# Patient Record
Sex: FEMALE | Race: WHITE | Employment: UNEMPLOYED | URBAN - NONMETROPOLITAN AREA
[De-identification: names, ages, dates, MRNs, and addresses within clinical notes are randomized per-mention and may not be internally consistent; named-entity substitution may affect disease eponyms.]

---

## 2017-02-22 DIAGNOSIS — M25.562 LEFT KNEE PAIN: Primary | ICD-10-CM

## 2017-02-24 ENCOUNTER — HOSPITAL ENCOUNTER (OUTPATIENT)
Dept: MRI IMAGING | Facility: HOSPITAL | Age: 37
Discharge: HOME | End: 2017-02-24
Attending: FAMILY MEDICINE | Admitting: FAMILY MEDICINE
Payer: MEDICAID

## 2017-02-24 PROCEDURE — 73721 MRI JNT OF LWR EXTRE W/O DYE: CPT | Mod: TC,LT

## 2017-03-19 ENCOUNTER — HOSPITAL ENCOUNTER (EMERGENCY)
Facility: HOSPITAL | Age: 37
Discharge: HOME | End: 2017-03-19
Attending: PHYSICIAN ASSISTANT | Admitting: PHYSICIAN ASSISTANT
Payer: COMMERCIAL

## 2017-03-19 VITALS
OXYGEN SATURATION: 96 % | SYSTOLIC BLOOD PRESSURE: 137 MMHG | BODY MASS INDEX: 27.89 KG/M2 | TEMPERATURE: 98.6 F | RESPIRATION RATE: 18 BRPM | DIASTOLIC BLOOD PRESSURE: 99 MMHG | HEART RATE: 98 BPM | WEIGHT: 200 LBS
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
## 2017-03-19 DIAGNOSIS — M25.562 ACUTE PAIN OF LEFT KNEE: ICD-10-CM

## 2017-03-19 PROCEDURE — 96372 THER/PROPH/DIAG INJ SC/IM: CPT

## 2017-03-19 PROCEDURE — 25000128 H RX IP 250 OP 636: Performed by: PHYSICIAN ASSISTANT

## 2017-03-19 PROCEDURE — 99284 EMERGENCY DEPT VISIT MOD MDM: CPT | Performed by: PHYSICIAN ASSISTANT

## 2017-03-19 PROCEDURE — 99284 EMERGENCY DEPT VISIT MOD MDM: CPT | Mod: 25

## 2017-03-19 RX ORDER — KETOROLAC TROMETHAMINE 10 MG/1
10 TABLET, FILM COATED ORAL EVERY 6 HOURS PRN
Qty: 20 TABLET | Refills: 0 | Status: SHIPPED | OUTPATIENT
Start: 2017-03-19 | End: 2017-04-07

## 2017-03-19 RX ORDER — KETOROLAC TROMETHAMINE 30 MG/ML
60 INJECTION, SOLUTION INTRAMUSCULAR; INTRAVENOUS ONCE
Status: COMPLETED | OUTPATIENT
Start: 2017-03-19 | End: 2017-03-19

## 2017-03-19 RX ORDER — KETOROLAC TROMETHAMINE 10 MG/1
10 TABLET, FILM COATED ORAL EVERY 4 HOURS PRN
Status: DISCONTINUED | OUTPATIENT
Start: 2017-03-19 | End: 2017-03-19 | Stop reason: HOSPADM

## 2017-03-19 RX ADMIN — KETOROLAC TROMETHAMINE 60 MG: 30 INJECTION, SOLUTION INTRAMUSCULAR; INTRAVENOUS at 20:36
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
## 2017-03-19 NOTE — ED AVS SNAPSHOT
[facility]    [address] [address]    Phone:  [phone]                                       Esperanza Bauer   MRN: [id number]    Department:  [facility]   Date of Visit:  3/19/2017           After Visit Summary Signature Page     I have received my discharge instructions, and my questions have been answered. I have discussed any challenges I see with this plan with the nurse or doctor.    ..........................................................................................................................................  Patient/Patient Representative Signature      ..........................................................................................................................................  Patient Representative Print Name and Relationship to Patient    ..................................................               ................................................  Date                                            Time    ..........................................................................................................................................  Reviewed by Signature/Title    ...................................................              ..............................................  Date                                                            Time
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
## 2017-03-20 NOTE — ED NOTES
Discharge instructions completed with patient. Patient angry with plan of care, stating Ibuprofen at home will not cover her pain. RADHA La at bedside to discuss with patient. Vital signs stable prior to discharge, currently rating the pain 10/10 to left knee. Knee immobilzer placed and crutches sent home with patient, teaching completed.

## 2017-03-20 NOTE — DISCHARGE INSTRUCTIONS
Wear the knee immobilizer during the day and use the crutches as needed. Take the Toradol as prescribed for pain. Do not take ibuprofen while using the toradol. Ice frequently for the next 48 hours. Follow through with the PT as was previously recommended.     Reducing Knee Pain and Swelling    Many treatments can help reduce pain and swelling in your knee. Your healthcare provider or physical therapist may suggest one or more of the following treatments:    Icing your knee helps reduce swelling. You may be asked to ice your knee once a day or more. Apply ice for about 15 to 20 minutes at a time, with at least 40 minutes between sessions. Always keep a towel between the ice and your skin.     Keeping your leg raised above your heart helps excess fluid flow out of your knee joint. This reduces swelling.    Compression means wrapping an elastic bandage or neoprene sleeve snugly around your knees. This keeps fluid from collecting in your knee joint.    Electrical stimulation, done by a physical therapist or , can help reduce excess fluid in your knee joint.    Anti-inflammatory medicines may be prescribed by your healthcare provider. You may take pills or receive injections in your knee.    Isometric (delfino) exercises strengthen the muscles that support your knee joint. They also help reduce excess fluid in your knee.    Massage helps fluid drain away from your knee.    6220-0406 [employer]. [address] [place] [address], [place] [address]. All rights reserved. This information is not intended as a substitute for professional medical care. Always follow your healthcare professional's instructions.
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
## 2017-04-07 ENCOUNTER — HOSPITAL ENCOUNTER (EMERGENCY)
Facility: HOSPITAL | Age: 37
Discharge: HOME | End: 2017-04-07
Attending: NURSE PRACTITIONER | Admitting: NURSE PRACTITIONER
Payer: COMMERCIAL

## 2017-04-07 VITALS
TEMPERATURE: 98.4 F | RESPIRATION RATE: 16 BRPM | OXYGEN SATURATION: 98 % | SYSTOLIC BLOOD PRESSURE: 125 MMHG | DIASTOLIC BLOOD PRESSURE: 83 MMHG

## 2017-04-07 DIAGNOSIS — R07.81 RIB PAIN ON RIGHT SIDE: ICD-10-CM

## 2017-04-07 PROCEDURE — 71101 X-RAY EXAM UNILAT RIBS/CHEST: CPT | Mod: TC,RT

## 2017-04-07 PROCEDURE — 99213 OFFICE O/P EST LOW 20 MIN: CPT | Performed by: NURSE PRACTITIONER

## 2017-04-07 PROCEDURE — 96372 THER/PROPH/DIAG INJ SC/IM: CPT

## 2017-04-07 PROCEDURE — 99214 OFFICE O/P EST MOD 30 MIN: CPT | Mod: 25

## 2017-04-07 PROCEDURE — 25000128 H RX IP 250 OP 636: Performed by: NURSE PRACTITIONER

## 2017-04-07 RX ORDER — INDOMETHACIN 25 MG/1
50 CAPSULE ORAL 2 TIMES DAILY WITH MEALS
Qty: 40 CAPSULE | Refills: 0 | Status: SHIPPED | OUTPATIENT
Start: 2017-04-07 | End: 2017-06-19

## 2017-04-07 RX ORDER — OXYCODONE AND ACETAMINOPHEN 5; 325 MG/1; MG/1
1-2 TABLET ORAL EVERY 6 HOURS PRN
Qty: 15 TABLET | Refills: 0 | Status: SHIPPED | OUTPATIENT
Start: 2017-04-07 | End: 2017-06-19

## 2017-04-07 RX ORDER — KETOROLAC TROMETHAMINE 30 MG/ML
60 INJECTION, SOLUTION INTRAMUSCULAR; INTRAVENOUS ONCE
Status: COMPLETED | OUTPATIENT
Start: 2017-04-07 | End: 2017-04-07

## 2017-04-07 RX ORDER — DIAZEPAM 10 MG/2ML
10 INJECTION, SOLUTION INTRAMUSCULAR; INTRAVENOUS ONCE
Status: COMPLETED | OUTPATIENT
Start: 2017-04-07 | End: 2017-04-07

## 2017-04-07 RX ADMIN — DIAZEPAM 10 MG: 5 INJECTION, SOLUTION INTRAMUSCULAR; INTRAVENOUS at 19:00

## 2017-04-07 RX ADMIN — KETOROLAC TROMETHAMINE 60 MG: 60 INJECTION, SOLUTION INTRAMUSCULAR at 18:59

## 2017-04-07 NOTE — ED AVS SNAPSHOT
[facility]    [address] [address]    Phone:  [phone]                                       Esperanza Bauer   MRN: [id number]    Department:  [facility]   Date of Visit:  4/7/2017           After Visit Summary Signature Page     I have received my discharge instructions, and my questions have been answered. I have discussed any challenges I see with this plan with the nurse or doctor.    ..........................................................................................................................................  Patient/Patient Representative Signature      ..........................................................................................................................................  Patient Representative Print Name and Relationship to Patient    ..................................................               ................................................  Date                                            Time    ..........................................................................................................................................  Reviewed by Signature/Title    ...................................................              ..............................................  Date                                                            Time
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
## 2017-04-07 NOTE — ED AVS SNAPSHOT
[facility]    [address] [address]    Phone:  [phone]                                       Esperanza Bauer   MRN: [id number]    Department:  [facility]   Date of Visit:  4/7/2017           Patient Information     Date Of Birth          1980        Your diagnoses for this visit were:     Rib pain on right side        You were seen by Oralia Garcia NP.      Follow-up Information     Follow up with [facility].    Specialty:  EMERGENCY MEDICINE    Why:  As needed, If symptoms worsen, or concerns develop    Contact information:    [address] [id number] [phone]    Additional information:    From [facility]: Take [address]. Turn left at [address]. Turn left at the first stoplight on [address] [place] [address]. At the first stop sign, take a right onto [address]. Take a left into the parking lot and continue through until you reach the North enterance of the building.       From [place]: Take [address]. Take the [address] ramp towards [place]. Turn left onto [address]. Take a slight right onto [address]. Turn left at the first stoplight on [address]. At the first stop sign, take a right onto [address]. Take a left into the parking lot and continue through until you reach the North enterance of the building.       From [place]: Take [address]. Take a right at [address]. At the first stop sign, take a right onto [address]. Take a left into the parking lot and continue through until you reach the North enterance of the building.         Follow up with Chester Bowser DO. Go on 4/10/2017.    Specialty:  Family Practice    Contact information:    [facility]  [address]  [facility] [address]  [phone]        Discharge References/Attachments     RIB CONTUSION OR MINOR FRACTURE (ENGLISH)         Review of  your medicines      START taking        Dose / Directions Last dose taken    indomethacin 25 MG capsule   Commonly known as:  INDOCIN   Dose:  50 mg   Quantity:  40 capsule        Take 2 capsules (50 mg) by mouth 2 times daily (with meals) for 10 days   Refills:  0        oxyCODONE-acetaminophen 5-325 MG per tablet   Commonly known as:  PERCOCET   Dose:  1-2 tablet   Quantity:  15 tablet        Take 1-2 tablets by mouth every 6 hours as needed for moderate to severe pain   Refills:  0          Our records show that you are taking the medicines listed below. If these are incorrect, please call your family doctor or clinic.        Dose / Directions Last dose taken    EFFEXOR PO   Dose:  600 mg        Take 600 mg by mouth daily   Refills:  0        SEROQUEL PO   Dose:  100 mg        Take 100 mg by mouth   Refills:  0        TOPAMAX PO   Dose:  600 mg        Take 600 mg by mouth   Refills:  0                Prescriptions were sent or printed at these locations (2 Prescriptions)                   [facility] [address] [facility], [place] - [address] [facility] [address]   [address] [facility] [address], [place] [address]    Telephone:  [phone]   Fax:  [phone]   Hours:                  E-Prescribed (1 of 2)         indomethacin (INDOCIN) 25 MG capsule                 Printed at Department/Unit printer (1 of 2)         oxyCODONE-acetaminophen (PERCOCET) 5-325 MG per tablet                Procedures and tests performed during your visit     Ribs XR, unilat 3 views + PA chest, right      Orders Needing Specimen Collection     None      Pending Results     Date and Time Order Name Status Description    4/7/2017 1851 Ribs XR, unilat 3 views + PA chest, right In process             Pending Culture Results     No orders found from 4/5/2017 to 4/8/2017.            Thank you for choosing [facility]       Thank you for choosing [facility] for your care. Our goal is always to provide you with excellent care. Hearing back from our patients is one way we can continue  "to improve our services. Please take a few minutes to complete the written survey that you may receive in the mail after you visit with us. Thank you!        [employer] Information     [employer] lets you send messages to your doctor, view your test results, renew your prescriptions, schedule appointments and more. To sign up, go to [email or web address] . Click on \"Log in\" on the left side of the screen, which will take you to the Welcome page. Then click on \"Sign up Now\" on the right side of the page.     You will be asked to enter the access code listed below, as well as some personal information. Please follow the directions to create your username and password.     Your access code is: [email or web address]  Expires: 2017  8:53 PM     Your access code will  in 90 days. If you need help or a new code, please call your [facility] clinic or [phone].        Care EveryWhere ID     This is your Care EveryWhere ID. This could be used by other organizations to access your [facility] medical records  [id number]        After Visit Summary       This is your record. Keep this with you and show to your community pharmacist(s) and doctor(s) at your next visit.                  "

## 2017-04-07 NOTE — ED NOTES
Pt presents with pain to right frontal rib area right side rib area and right posterior rib area since 2-1/2 weeks ago. Door was shut on pt.

## 2017-04-08 ASSESSMENT — ENCOUNTER SYMPTOMS
COUGH: 0
SHORTNESS OF BREATH: 0
FEVER: 0
STRIDOR: 0
FATIGUE: 0
APPETITE CHANGE: 0
DIAPHORESIS: 0

## 2017-04-09 NOTE — PROGRESS NOTES
Chest XR with Ribs report routed to PCP, Dr. EMMA Bowser.  Impression - some interstitial change at the lung bases, slightly more prominent than on the prior examination.  Pt advised to follow up as needed.

## 2017-06-19 ENCOUNTER — HOSPITAL ENCOUNTER (EMERGENCY)
Facility: HOSPITAL | Age: 37
Discharge: HOME | End: 2017-06-20
Attending: EMERGENCY MEDICINE | Admitting: EMERGENCY MEDICINE
Payer: COMMERCIAL

## 2017-06-19 DIAGNOSIS — R11.2 NON-INTRACTABLE VOMITING WITH NAUSEA, UNSPECIFIED VOMITING TYPE: ICD-10-CM

## 2017-06-19 PROCEDURE — 96361 HYDRATE IV INFUSION ADD-ON: CPT

## 2017-06-19 PROCEDURE — 96374 THER/PROPH/DIAG INJ IV PUSH: CPT

## 2017-06-19 PROCEDURE — 25000128 H RX IP 250 OP 636: Performed by: EMERGENCY MEDICINE

## 2017-06-19 PROCEDURE — 99284 EMERGENCY DEPT VISIT MOD MDM: CPT | Mod: 25

## 2017-06-19 PROCEDURE — 99284 EMERGENCY DEPT VISIT MOD MDM: CPT | Performed by: EMERGENCY MEDICINE

## 2017-06-19 PROCEDURE — 96375 TX/PRO/DX INJ NEW DRUG ADDON: CPT

## 2017-06-19 RX ORDER — SODIUM CHLORIDE 9 MG/ML
INJECTION, SOLUTION INTRAVENOUS CONTINUOUS
Status: DISCONTINUED | OUTPATIENT
Start: 2017-06-19 | End: 2017-06-20 | Stop reason: HOSPADM

## 2017-06-19 RX ORDER — ONDANSETRON 2 MG/ML
4 INJECTION INTRAMUSCULAR; INTRAVENOUS ONCE
Status: COMPLETED | OUTPATIENT
Start: 2017-06-19 | End: 2017-06-19

## 2017-06-19 RX ADMIN — ONDANSETRON 4 MG: 2 INJECTION, SOLUTION INTRAMUSCULAR; INTRAVENOUS at 23:06

## 2017-06-19 RX ADMIN — SODIUM CHLORIDE: 9 INJECTION, SOLUTION INTRAVENOUS at 23:39

## 2017-06-19 RX ADMIN — PROCHLORPERAZINE EDISYLATE 5 MG: 5 INJECTION INTRAMUSCULAR; INTRAVENOUS at 23:40

## 2017-06-19 ASSESSMENT — ENCOUNTER SYMPTOMS
FEVER: 0
WEAKNESS: 0
DIARRHEA: 0
ABDOMINAL DISTENTION: 0
CHILLS: 0
VOMITING: 1
EYES NEGATIVE: 1
DIZZINESS: 0
ABDOMINAL PAIN: 0
NAUSEA: 1
LIGHT-HEADEDNESS: 0
COUGH: 0

## 2017-06-19 NOTE — ED AVS SNAPSHOT
[facility]    [address] [address]    Phone:  [phone]                                       Esperanza Bauer   MRN: [id number]    Department:  [facility]   Date of Visit:  6/19/2017           Patient Information     Date Of Birth          1980        Your diagnoses for this visit were:     Non-intractable vomiting with nausea, unspecified vomiting type        You were seen by Harshil Najera MD.      Follow-up Information     Follow up with Chester Bowser DO In 2 days.    Specialty:  Family Practice    Why:  if you continue to have nausea and  vomiting    Contact information:    [facility]  [address] [facility] [address] [phone]          Follow up with [facility].    Specialty:  EMERGENCY MEDICINE    Why:  As needed, If symptoms worsen    Contact information:    [address] [address] [phone]    Additional information:    From [facility]: Take [address]. Turn left at [address]. Turn left at the first stoplight on [address]. At the first stop sign, take a right onto [address]. Take a left into the parking lot and continue through until you reach the North enterance of the building.       From [place]: Take [address]. Take the [address] ramp towards [place]. Turn left onto [address]. Take a slight right onto [address]. Turn left at the first stoplight on [address]. At the first stop sign, take a right onto [address]. Take a left into the parking lot and continue through until you reach the North enterance of the building.       From [place]: Take [address]. Take a right at [address]. At the first stop sign, take a right onto [address]. Take a left into the parking lot and continue through until you reach the North enterance of the building.         Discharge Instructions          *  "VOMITING [6yr-Adult]  Vomiting is a common symptom that may be due to different causes. These include gastroenteritis (\"stomach-flu\"), food poisoning and gastritis. There are other more serious causes of vomiting which may be hard to diagnose early in the illness. Therefore, it is important to watch for the warning signs listed below.  The main danger from repeated vomiting is \"dehydration\". This is due to excess loss of water and minerals from the body. When this occurs, body fluids must be replaced.`  HOME CARE:    If symptoms are severe, rest at home for the next 24 hours.    You may use acetaminophen (Tylenol) 650-1000 mg every 6 hours to control fever, unless another medicine was prescribed. [ NOTE : If you have chronic liver disease, talk with your doctor before using acetaminophen.] (Aspirin should never be used in anyone under 18 years of age who is ill with a fever. It may cause severe liver damage.)    Avoid tobacco and alcohol use, which may worsen your symptoms.    If medicines for vomiting were prescribed, take as directed.  DURING THE FIRST 12-24 HOURS follow the diet below. Try to take frequent small sips even if you vomit occasionally:    FRUIT JUICES: Apple, grape juice, clear fruit drinks, electrolyte replacement and sports drinks.    BEVERAGES: Sport drinks such as Gatorade, soft drinks without caffeine; mineral water (plain or flavored), decaffeinated tea and coffee.    SOUPS: Clear broth, consommé and bouillon    DESSERTS: Plain gelatin (Jell-O), popsicles and fruit juice bars.  DURING THE NEXT 24 HOURS you may add the following to the above:    Hot cereal, plain toast, bread, rolls, crackers    Plain noodles, rice, mashed potatoes, chicken noodle or rice soup    Unsweetened canned fruit (avoid pineapple), bananas    Avoid dairy products    Limit caffeine and chocolate. No spices or seasonings except salt.  DURING THE NEXT 24 HOURS  Slowly go back to a normal diet, as you feel better and your " symptoms lessen.  FOLLOW UP with your doctor as advised if you are not improving over the next 2-3 days.  GET PROMPT MEDICAL ATTENTION if any of the following occur:    Constant abdominal pain that stays in the same spot or gets worse    Continued vomiting (unable to keep liquids down) for 24 hours    Frequent diarrhea (more than 5 times a day); blood (red or black color) in diarrhea    No urine output for 12 hours or extreme thirst    Weakness, dizziness or fainting    Unusually drowsy or confused    Fever over 101.0  F (38.3  C) for more than 3 days    Yellow color of the eyes or skin    1479-4762 [facility], [address] [place] [address]. All rights reserved. This information is not intended as a substitute for professional medical care. Always follow your healthcare professional's instructions.         Review of your medicines      START taking        Dose / Directions Last dose taken    prochlorperazine 10 MG tablet   Commonly known as:  COMPAZINE   Dose:  10 mg   Quantity:  6 tablet        Take 1 tablet (10 mg) by mouth every 6 hours as needed for nausea or vomiting   Refills:  1          Our records show that you are taking the medicines listed below. If these are incorrect, please call your family doctor or clinic.        Dose / Directions Last dose taken    EFFEXOR PO   Dose:  600 mg        Take 600 mg by mouth daily   Refills:  0        SEROQUEL PO   Dose:  100 mg        Take 100 mg by mouth   Refills:  0        TOPAMAX PO   Dose:  600 mg        Take 600 mg by mouth   Refills:  0                Prescriptions were sent or printed at these locations (1 Prescription)                   [facility] [address] [address]    Telephone:  [phone]   Fax:  [phone]   Hours:                  Printed at Department/Unit printer (1 of 1)         prochlorperazine (COMPAZINE) 10 MG tablet                Procedures and tests  "performed during your visit     Draw and hold    Peripheral IV: Standard      Orders Needing Specimen Collection     None      Pending Results     No orders found for last 3 day(s).            Pending Culture Results     No orders found for last 3 day(s).            Thank you for choosing [facility]       Thank you for choosing [facility] for your care. Our goal is always to provide you with excellent care. Hearing back from our patients is one way we can continue to improve our services. Please take a few minutes to complete the written survey that you may receive in the mail after you visit with us. Thank you!        [employer] Information     [facility] [employer] lets you send messages to your doctor, view your test results, renew your prescriptions, schedule appointments and more. To sign up, go to [email or web address] . Click on \"Log in\" on the left side of the screen, which will take you to the Welcome page. Then click on \"Sign up Now\" on the right side of the page.     You will be asked to enter the access code listed below, as well as some personal information. Please follow the directions to create your username and password.     Your access code is: [email or web address]  Expires: 2017 12:35 AM     Your access code will  in 90 days. If you need help or a new code, please call your [facility] clinic or [phone].        Care EveryWhere ID     This is your Care EveryWhere ID. This could be used by other organizations to access your [facility] medical records  [id number]        After Visit Summary       This is your record. Keep this with you and show to your community pharmacist(s) and doctor(s) at your next visit.                  "

## 2017-06-19 NOTE — ED AVS SNAPSHOT
[facility]    [address] [address]    Phone:  [phone]                                       Esperanza Bauer   MRN: [id number]    Department[facility]  [facility]   Date of Visit:  6/19/2017           After Visit Summary Signature Page     I have received my discharge instructions, and my questions have been answered. I have discussed any challenges I see with this plan with the nurse or doctor.    ..........................................................................................................................................  Patient/Patient Representative Signature      ..........................................................................................................................................  Patient Representative Print Name and Relationship to Patient    ..................................................               ................................................  Date                                            Time    ..........................................................................................................................................  Reviewed by Signature/Title    ...................................................              ..............................................  Date                                                            Time

## 2017-06-20 VITALS
RESPIRATION RATE: 19 BRPM | TEMPERATURE: 98.1 F | OXYGEN SATURATION: 96 % | DIASTOLIC BLOOD PRESSURE: 88 MMHG | SYSTOLIC BLOOD PRESSURE: 114 MMHG | HEART RATE: 76 BPM

## 2017-06-20 RX ORDER — PROCHLORPERAZINE MALEATE 10 MG
10 TABLET ORAL EVERY 6 HOURS PRN
Qty: 6 TABLET | Refills: 1 | Status: SHIPPED | OUTPATIENT
Start: 2017-06-20

## 2017-06-20 NOTE — DISCHARGE INSTRUCTIONS
"   * VOMITING [6yr-Adult]  Vomiting is a common symptom that may be due to different causes. These include gastroenteritis (\"stomach-flu\"), food poisoning and gastritis. There are other more serious causes of vomiting which may be hard to diagnose early in the illness. Therefore, it is important to watch for the warning signs listed below.  The main danger from repeated vomiting is \"dehydration\". This is due to excess loss of water and minerals from the body. When this occurs, body fluids must be replaced.`  HOME CARE:    If symptoms are severe, rest at home for the next 24 hours.    You may use acetaminophen (Tylenol) 650-1000 mg every 6 hours to control fever, unless another medicine was prescribed. [ NOTE : If you have chronic liver disease, talk with your doctor before using acetaminophen.] (Aspirin should never be used in anyone under 18 years of age who is ill with a fever. It may cause severe liver damage.)    Avoid tobacco and alcohol use, which may worsen your symptoms.    If medicines for vomiting were prescribed, take as directed.  DURING THE FIRST 12-24 HOURS follow the diet below. Try to take frequent small sips even if you vomit occasionally:    FRUIT JUICES: Apple, grape juice, clear fruit drinks, electrolyte replacement and sports drinks.    BEVERAGES: Sport drinks such as Gatorade, soft drinks without caffeine; mineral water (plain or flavored), decaffeinated tea and coffee.    SOUPS: Clear broth, consommé and bouillon    DESSERTS: Plain gelatin (Jell-O), popsicles and fruit juice bars.  DURING THE NEXT 24 HOURS you may add the following to the above:    Hot cereal, plain toast, bread, rolls, crackers    Plain noodles, rice, mashed potatoes, chicken noodle or rice soup    Unsweetened canned fruit (avoid pineapple), bananas    Avoid dairy products    Limit caffeine and chocolate. No spices or seasonings except salt.  DURING THE NEXT 24 HOURS  Slowly go back to a normal diet, as you feel better and " your symptoms lessen.  FOLLOW UP with your doctor as advised if you are not improving over the next 2-3 days.  GET PROMPT MEDICAL ATTENTION if any of the following occur:    Constant abdominal pain that stays in the same spot or gets worse    Continued vomiting (unable to keep liquids down) for 24 hours    Frequent diarrhea (more than 5 times a day); blood (red or black color) in diarrhea    No urine output for 12 hours or extreme thirst    Weakness, dizziness or fainting    Unusually drowsy or confused    Fever over 101.0  F (38.3  C) for more than 3 days    Yellow color of the eyes or skin    4983-4462 [facility], [address] [place] [address]. All rights reserved. This information is not intended as a substitute for professional medical care. Always follow your healthcare professional's instructions.

## 2017-06-20 NOTE — ED PROVIDER NOTES
History     Chief Complaint   Patient presents with     Vomiting     states vomits every time she eats since Friday. Denies abdominal pain.     HPI Comments: 22.49  Brought by girlfriend    Chief complaint  Nausea and vomiting    History of present illness  36-year-old female started feeling nausea 2 days ago and then started vomiting that evening as well as yesterday and today, once daily.  No fever  Note Chris pain  No diarrhea  Able to drink small amounts of fluids.  No suspicious foods  No sick contacts  No history of similar problems  Does not work currently    The history is provided by the patient and medical records.     Past Medical History:   Diagnosis Date     Anxiety      Depression      Malignant hyperthermia     Brother has malignant hyperthermia     PONV (postoperative nausea and vomiting)      Past Surgical History:   Procedure Laterality Date     LAPAROSCOPIC TUBAL OCCLUSION  1/22/2014    Procedure: LAPAROSCOPIC TUBAL OCCLUSION PROCEDURE;  LAPAROSCOPIC BILATERAL TUBAL OCCLUSION;  Surgeon: Juaquin Chu MD;  Location: [facility]     ORTHOPEDIC SURGERY      bilateral rotator cuff surgery        Allergies   Allergen Reactions     Morphine Sulfate Itching     Sulfa Drugs      Unsure of reaction     Current Facility-Administered Medications   Medication     0.9% sodium chloride infusion     Current Outpatient Prescriptions   Medication     prochlorperazine (COMPAZINE) 10 MG tablet     QUEtiapine Fumarate (SEROQUEL PO)     Venlafaxine HCl (EFFEXOR PO)     Topiramate (TOPAMAX PO)     Facility-Administered Medications Ordered in Other Encounters   Medication     Flumazenil SOLN         Review of Systems   Constitutional: Negative for chills and fever.   HENT: Negative.    Eyes: Negative.    Respiratory: Negative for cough.    Gastrointestinal: Positive for nausea and vomiting. Negative for abdominal distention, abdominal pain and diarrhea.   Genitourinary: Negative.    Neurological: Negative for dizziness,  syncope, weakness and light-headedness.   All other systems reviewed and are negative.      Physical Exam   BP: 132/99  Pulse: 84  Resp: 18  SpO2: 99 %  Physical Exam   Constitutional: She is oriented to person, place, and time. She appears well-developed and well-nourished. No distress.   Alert appears well  Moist mucous membranes  Vital signs normal  No difficulty speaking or breathing   HENT:   Head: Normocephalic.   Left Ear: External ear normal.   Nose: Nose normal.   Mouth/Throat: Oropharynx is clear and moist. No oropharyngeal exudate.   Eyes: Conjunctivae are normal. Pupils are equal, round, and reactive to light.   Neck: Neck supple.   Cardiovascular: Normal rate, regular rhythm and intact distal pulses.    Pulmonary/Chest: Effort normal. No respiratory distress.   Abdominal: Soft. Bowel sounds are normal. She exhibits no distension and no mass. There is no tenderness. There is no rebound and no guarding.   Musculoskeletal: She exhibits no edema or tenderness.   Lymphadenopathy:     She has no cervical adenopathy.   Neurological: She is alert and oriented to person, place, and time. She exhibits normal muscle tone.   Skin: She is not diaphoretic.   Psychiatric: She has a normal mood and affect. Her behavior is normal.   Nursing note and vitals reviewed.      ED Course     ED Course     Procedures         Nursing staff had established IV and taken blood prior to my assessment  Exam  Ondansetron 4 mg IV    23.30 no improvement in nausea noted; Compazine 5 mg IV, normal saline IV    00.30        Labs Ordered and Resulted from Time of ED Arrival Up to the Time of Departure from the ED - No data to display    Assessments & Plan (with Medical Decision Making)     I have reviewed the nursing notes.    I have reviewed the findings, diagnosis, plan and need for follow up with the patient.  Symptomatic treatment  36-year-old female with over 48 hours of nausea and 3 episodes of emesis, no evidence of dehydration or  signs of CNS deterioration on exam.  Abdomen abdomen soft and nontender    Thought to be most likely viral infection    New Prescriptions    PROCHLORPERAZINE (COMPAZINE) 10 MG TABLET    Take 1 tablet (10 mg) by mouth every 6 hours as needed for nausea or vomiting       Final diagnoses:   Non-intractable vomiting with nausea, unspecified vomiting type   recheck if symptoms are worsening/persisting  See discharge instructions  6/19/2017   [facility]     Harshil Najera MD  06/20/17 0044       Harshil Najera MD  06/20/17 0045

## 2017-06-20 NOTE — ED NOTES
Presents with nausea and vomiting after eating. One times per day since Friday. Feels like she is hung over but has not been drinking. No known fever, no diarrhea. No chance of pregnancy states. Has had tubes tied in 2014.

## 2017-06-20 NOTE — ED NOTES
"Arrived to triage with c/o vomiting every time she eats since last Friday. States there is no way she is pregnant as she has her tubes tied. Denies any pain. Denies fevers. Denies diarrhea. States, \"I feel drunk, I feel hung over, it's really weird.\" Denies any alcohol use.   "

## 2017-07-08 ENCOUNTER — HEALTH MAINTENANCE LETTER (OUTPATIENT)
Age: 37
End: 2017-07-08